# Patient Record
(demographics unavailable — no encounter records)

---

## 2025-07-29 NOTE — HISTORY OF PRESENT ILLNESS
[FreeTextEntry1] : ENOC barillas is 8 weeks s/p fall onto a curb injuring his r lateral brow/eyelid ENOC sustained laceration without brain or bone injury per ct scan done at the time  he is concerned with the scar which is against rstls in the area of the lateral canthal eyelid  ENOC also concerned with residual edema of the upper eyelid ENOC has normal vision and cn function  there is a scar from the lateral brow extending inferiorly along the area lateral to the canthus  and there is some edema of the upper eyelid laterally causing mild hooding of the eye.

## 2025-07-29 NOTE — ASSESSMENT
[FreeTextEntry1] : ENOC has scar against rstls ENOC can have revision of scar but hard to align with rstls when curves directly across them  discussed multiple z plasties or w plasties but wait until 8-1 2 months for edema to subside ,maxilmally  ENOC will return to the office for a post procedure visit as needed in 8 months

## 2025-07-29 NOTE — PHYSICAL EXAM
[NI] : Normal [de-identified] : at nc  [de-identified] : gianfranco eomi conjunctiva nl nl cn function  scar 4 cm long from lateral brow to lower eyelid lateral to canthus